# Patient Record
Sex: FEMALE | Race: OTHER | NOT HISPANIC OR LATINO | URBAN - NONMETROPOLITAN AREA
[De-identification: names, ages, dates, MRNs, and addresses within clinical notes are randomized per-mention and may not be internally consistent; named-entity substitution may affect disease eponyms.]

---

## 2022-04-12 NOTE — PATIENT DISCUSSION
From: [facility]  Sent: 6/25/2017 11:53 AM CDT  Subject: Medication Renewal Request    [facility] would like a refill of the following medications:  Canagliflozin (INVOKANA) 100 MG Oral Tab Tiffanie Campuzano MD]    Preferred pharmacy: [facility]

## 2022-12-16 ENCOUNTER — CONSULTATION/EVALUATION (OUTPATIENT)
Facility: LOCATION | Age: 72
End: 2022-12-16

## 2022-12-16 PROCEDURE — 66821 AFTER CATARACT LASER SURGERY: CPT

## 2022-12-16 PROCEDURE — 99214 OFFICE O/P EST MOD 30 MIN: CPT

## 2022-12-16 ASSESSMENT — VISUAL ACUITY
OD_CC: J10
OS_SC: 20/50+2
OD_SC: 20/50+2
OU_CC: J7
OU_SC: J5
OS_CC: J10
OU_SC: 20/50+2
OS_SC: J10-1
OD_SC: J7
OS_PH: 20/40-2

## 2022-12-16 ASSESSMENT — TONOMETRY
OS_IOP_MMHG: 18
OD_IOP_MMHG: 17